# Patient Record
Sex: FEMALE | Race: WHITE | NOT HISPANIC OR LATINO | Employment: OTHER | URBAN - NONMETROPOLITAN AREA
[De-identification: names, ages, dates, MRNs, and addresses within clinical notes are randomized per-mention and may not be internally consistent; named-entity substitution may affect disease eponyms.]

---

## 2020-01-16 ENCOUNTER — OFFICE VISIT (OUTPATIENT)
Dept: CARDIOLOGY | Facility: CLINIC | Age: 75
End: 2020-01-16

## 2020-01-16 VITALS
DIASTOLIC BLOOD PRESSURE: 70 MMHG | SYSTOLIC BLOOD PRESSURE: 122 MMHG | WEIGHT: 137 LBS | HEIGHT: 59 IN | BODY MASS INDEX: 27.62 KG/M2 | HEART RATE: 85 BPM

## 2020-01-16 DIAGNOSIS — R53.83 FATIGUE, UNSPECIFIED TYPE: ICD-10-CM

## 2020-01-16 DIAGNOSIS — E88.81 METABOLIC SYNDROME: ICD-10-CM

## 2020-01-16 DIAGNOSIS — E78.00 HYPERCHOLESTEREMIA: ICD-10-CM

## 2020-01-16 DIAGNOSIS — R42 DIZZINESS: ICD-10-CM

## 2020-01-16 DIAGNOSIS — R73.9 HYPERGLYCEMIA: ICD-10-CM

## 2020-01-16 DIAGNOSIS — R09.89 BILATERAL CAROTID BRUITS: ICD-10-CM

## 2020-01-16 DIAGNOSIS — R07.2 PRECORDIAL PAIN: Primary | ICD-10-CM

## 2020-01-16 DIAGNOSIS — R55 SYNCOPE AND COLLAPSE: ICD-10-CM

## 2020-01-16 DIAGNOSIS — R01.1 MURMUR, CARDIAC: ICD-10-CM

## 2020-01-16 DIAGNOSIS — R06.02 SHORTNESS OF BREATH: ICD-10-CM

## 2020-01-16 PROCEDURE — 99204 OFFICE O/P NEW MOD 45 MIN: CPT | Performed by: INTERNAL MEDICINE

## 2020-01-16 PROCEDURE — 93000 ELECTROCARDIOGRAM COMPLETE: CPT | Performed by: INTERNAL MEDICINE

## 2020-01-16 RX ORDER — CITALOPRAM 20 MG/1
20 TABLET ORAL DAILY
COMMUNITY
End: 2020-12-14 | Stop reason: ALTCHOICE

## 2020-01-16 RX ORDER — LISINOPRIL 20 MG/1
20 TABLET ORAL DAILY
COMMUNITY
End: 2020-06-29 | Stop reason: SDUPTHER

## 2020-01-16 RX ORDER — PRAVASTATIN SODIUM 20 MG
20 TABLET ORAL DAILY
COMMUNITY
End: 2020-01-22 | Stop reason: ALTCHOICE

## 2020-01-16 NOTE — PATIENT INSTRUCTIONS
Mediterranean Diet  A Mediterranean diet refers to food and lifestyle choices that are based on the traditions of countries located on the [place]. This way of eating has been shown to help prevent certain conditions and improve outcomes for people who have chronic diseases, like kidney disease and heart disease.  What are tips for following this plan?  Lifestyle  · Cook and eat meals together with your family, when possible.  · Drink enough fluid to keep your urine clear or pale yellow.  · Be physically active every day. This includes:  ? Aerobic exercise like running or swimming.  ? Leisure activities like gardening, walking, or housework.  · Get 7-8 hours of sleep each night.  · If recommended by your health care provider, drink red wine in moderation. This means 1 glass a day for nonpregnant women and 2 glasses a day for men. A glass of wine equals 5 oz (150 mL).  Reading food labels    · Check the serving size of packaged foods. For foods such as rice and pasta, the serving size refers to the amount of cooked product, not dry.  · Check the total fat in packaged foods. Avoid foods that have saturated fat or trans fats.  · Check the ingredients list for added sugars, such as corn syrup.  Shopping  · At the grocery store, buy most of your food from the areas near the walls of the store. This includes:  ? Fresh fruits and vegetables (produce).  ? Grains, beans, nuts, and seeds. Some of these may be available in unpackaged forms or large amounts (in bulk).  ? Fresh seafood.  ? Poultry and eggs.  ? Low-fat dairy products.  · Buy whole ingredients instead of prepackaged foods.  · Buy fresh fruits and vegetables in-season from local farmers markets.  · Buy frozen fruits and vegetables in resealable bags.  · If you do not have access to quality fresh seafood, buy precooked frozen shrimp or canned fish, such as tuna, salmon, or sardines.  · Buy small amounts of raw or cooked vegetables, salads, or olives from  the deli or salad bar at your store.  · Stock your pantry so you always have certain foods on hand, such as olive oil, canned tuna, canned tomatoes, rice, pasta, and beans.  Cooking  · Cook foods with extra-virgin olive oil instead of using butter or other vegetable oils.  · Have meat as a side dish, and have vegetables or grains as your main dish. This means having meat in small portions or adding small amounts of meat to foods like pasta or stew.  · Use beans or vegetables instead of meat in common dishes like chili or lasagna.  · [place] with different cooking methods. Try roasting or broiling vegetables instead of steaming or sautéeing them.  · Add frozen vegetables to soups, stews, pasta, or rice.  · Add nuts or seeds for added healthy fat at each meal. You can add these to yogurt, salads, or vegetable dishes.  · Marinate fish or vegetables using olive oil, lemon juice, garlic, and fresh herbs.  Meal planning    · Plan to eat 1 vegetarian meal one day each week. Try to work up to 2 vegetarian meals, if possible.  · Eat seafood 2 or more times a week.  · Have healthy snacks readily available, such as:  ? Vegetable sticks with hummus.  ? Greek yogurt.  ? Fruit and nut trail mix.  · Eat balanced meals throughout the week. This includes:  ? Fruit: 2-3 servings a day  ? Vegetables: 4-5 servings a day  ? Low-fat dairy: 2 servings a day  ? Fish, poultry, or lean meat: 1 serving a day  ? Beans and legumes: 2 or more servings a week  ? Nuts and seeds: 1-2 servings a day  ? Whole grains: 6-8 servings a day  ? Extra-virgin olive oil: 3-4 servings a day  · Limit red meat and sweets to only a few servings a month  What are my food choices?  · Mediterranean diet  ? Recommended  ? Grains: Whole-grain pasta. Brown rice. Bulgar wheat. Polenta. Couscous. Whole-wheat bread. Oatmeal. Quinoa.  ? Vegetables: Artichokes. Beets. Broccoli. Cabbage. Carrots. Eggplant. Green beans. Chard. Kale. Spinach. Onions. Leeks. Peas. Squash.  Tomatoes. Peppers. Radishes.  ? Fruits: Apples. Apricots. Avocado. Berries. Bananas. Cherries. Dates. Figs. Grapes. [place]. Melon. Oranges. Peaches. Plums. Pomegranate.  ? Meats and other protein foods: Beans. Almonds. Sunflower seeds. Pine nuts. Peanuts. Cod. [place]. Scallops. Shrimp. Tuna. Tilapia. Clams. Oysters. Eggs.  ? Dairy: Low-fat milk. Cheese. Greek yogurt.  ? Beverages: Water. Red wine. Herbal tea.  ? Fats and oils: Extra virgin olive oil. Avocado oil. Grape seed oil.  ? Sweets and desserts: Greek yogurt with honey. Baked apples. Poached pears. Trail mix.  ? Seasoning and other foods: Basil. Cilantro. Coriander. Cumin. Mint. Parsley. [place]. Rosemary. Tarragon. Garlic. Oregano. Thyme. Pepper. Balsalmic vinegar. Tahini. Hummus. Tomato sauce. Olives. Mushrooms.  ? Limit these  ? Grains: Prepackaged pasta or rice dishes. Prepackaged cereal with added sugar.  ? Vegetables: Deep fried potatoes (french fries).  ? Fruits: Fruit canned in syrup.  ? Meats and other protein foods: Beef. Pork. Lamb. Poultry with skin. Hot dogs. Campbell.  ? Dairy: Ice cream. Sour cream. Whole milk.  ? Beverages: Juice. Sugar-sweetened soft drinks. Beer. Liquor and spirits.  ? Fats and oils: Butter. Canola oil. Vegetable oil. Beef fat (tallow). Lard.  ? Sweets and desserts: Cookies. Cakes. Pies. Candy.  ? Seasoning and other foods: Mayonnaise. Premade sauces and marinades.  ? The items listed may not be a complete list. Talk with your dietitian about what dietary choices are right for you.  Summary  · The Mediterranean diet includes both food and lifestyle choices.  · Eat a variety of fresh fruits and vegetables, beans, nuts, seeds, and whole grains.  · Limit the amount of red meat and sweets that you eat.  · Talk with your health care provider about whether it is safe for you to drink red wine in moderation. This means 1 glass a day for nonpregnant women and 2 glasses a day for men. A glass of wine equals 5 oz (150 mL).  This information  is not intended to replace advice given to you by your health care provider. Make sure you discuss any questions you have with your health care provider.  Document Released: 08/10/2017 Document Revised: 09/12/2017 Document Reviewed: 08/10/2017  [employer] Interactive Patient Education © 2019 [employer]

## 2020-01-16 NOTE — PROGRESS NOTES
Chief Complaint   Patient presents with   • Establish Care     chest pain, heart murmur   • Chest Pain     started about 2 months ago, occurs with exertion, mid chest, SOB noted, symptoms improve after resting.  Does report positional at times. Worse with taking in a deep breath of cold air or talking for an extended time.    • Shortness of Breath     with exertion, started about 2 months ago.    • Cardiac history     stress test several years ago but isn't sure when or exactly where.    • Aspirin     does not take a daily aspirin   • Labs     pt isn't sure when labs have been done, checking with [facility] lab        CARDIAC COMPLAINTS  chest pressure/discomfort, dyspnea, Dizziness, fatigue and syncope      Meli Frey is a 74 y.o. female came in today for her initial cardiac evaluation.  She did come in with one of her friends.  She apparently was diagnosed with hypertension, hypercholesterolemia and also possible bipolar disorder.  She was put on Celexa and also an ACE inhibitor and a statin.  She has been having problems in the form of confusion and feeling dizzy all the time.  She stopped taking her ACE inhibitors as well as the statin and felt little better.  She is now referred because she has been having chest pain for the last couple of months.  It occurs mostly with exertion.  It is in the mid part of the chest associated with increasing shortness of breath and the symptoms do improve if she takes rest .  She also started noticing the symptoms when she is exposed to cold air for long period of time.  She also has been noticing some dizziness which occurs mostly positional.  She is not sure when was the last time she did undergo any kind of lab work.  On further questioning she had 2 episodes of syncope about a year ago.  Both episodes happened on the same day and she did not seek any medical attention at that time it happened suddenly and lasted only for few seconds.  It was not associated with  any involuntary movements.  She also has problem with her weight apparently she lost fairly large amount of weight almost 40 pounds and then slowly gained some of them back.  She is not a smoker.  Her father  of a heart attack and so did her mother.  She is not sure exactly about the age since she was adopted.    History reviewed. No pertinent surgical history.    Current Outpatient Medications   Medication Sig Dispense Refill   • citalopram (CeleXA) 20 MG tablet Take 20 mg by mouth Daily.     • lisinopril (PRINIVIL,ZESTRIL) 20 MG tablet Take 20 mg by mouth Daily.     • pravastatin (PRAVACHOL) 20 MG tablet Take 20 mg by mouth Daily.       No current facility-administered medications for this visit.            ALLERGIES:  Patient has no known allergies.    Past Medical History:   Diagnosis Date   • Bipolar disease, chronic (CMS/[facility])    • COPD (chronic obstructive pulmonary disease) (CMS/HCC)    • Diabetes mellitus (CMS/HCC)    • Heart murmur    • History of cholecystectomy    • Hyperlipidemia    • Hypertension    • Sleep apnea        Social History     Tobacco Use   Smoking Status Never Smoker   Smokeless Tobacco Never Used          Family History   Problem Relation Age of Onset   • Heart attack Mother    • Other Mother    • Heart disease Mother          during open heart surgery   • Heart attack Father    • Other Father    • Other Other         5 siblings, medical history unknown    • No Known Problems Son    • No Known Problems Son    • No Known Problems Son        Review of Systems   Constitution: Positive for malaise/fatigue. Negative for decreased appetite.   HENT: Negative for congestion and sore throat.    Eyes: Negative for blurred vision.   Cardiovascular: Positive for chest pain, dyspnea on exertion, near-syncope and syncope.   Respiratory: Positive for shortness of breath. Negative for snoring.    Endocrine: Negative for cold intolerance and heat intolerance.   Hematologic/Lymphatic: Negative for  "adenopathy. Does not bruise/bleed easily.   Skin: Negative for itching, nail changes and skin cancer.   Musculoskeletal: Negative for arthritis and myalgias.   Gastrointestinal: Negative for abdominal pain, dysphagia and heartburn.   Genitourinary: Negative for bladder incontinence and frequency.   Neurological: Positive for dizziness and light-headedness. Negative for seizures and vertigo.   Psychiatric/Behavioral: Negative for altered mental status.   Allergic/Immunologic: Negative for environmental allergies and hives.       Diabetes- No  Thyroid- normal    Objective     /70 (BP Location: Left arm)   Pulse 85   Ht 149.9 cm (59\")   Wt 62.1 kg (137 lb)   BMI 27.67 kg/m²     Physical Exam   Constitutional: She is oriented to person, place, and time. She appears well-developed and well-nourished.   HENT:   Head: Normocephalic.   Nose: Nose normal.   Eyes: Pupils are equal, round, and reactive to light. EOM are normal.   Neck: Normal range of motion. Neck supple.   Cardiovascular: Normal rate, regular rhythm, S1 normal and S2 normal.   Murmur heard.   Harsh midsystolic murmur is present at the upper right sternal border radiating to the neck.  Pulmonary/Chest: Effort normal and breath sounds normal.   Abdominal: Soft. Bowel sounds are normal.   Musculoskeletal: Normal range of motion. She exhibits no edema.   Neurological: She is alert and oriented to person, place, and time.   Skin: Skin is warm and dry.   Psychiatric: She has a normal mood and affect.         ECG 12 Lead  Date/Time: 1/16/2020 1:23 PM  Performed by: Leeroy Donnelly MD  Authorized by: Leeroy Donnelly MD   Comparison: compared with previous ECG from 1/11/2018  Comparison to previous ECG: Now has LPFB  Rhythm: sinus rhythm  Rate: normal  Conduction: right bundle branch block and left posterior fascicular block  QRS axis: right    Clinical impression: abnormal EKG              Assessment/Plan   Patient's Body mass index is 27.67 " kg/m². BMI is above normal parameters. Recommendations include: educational material, exercise counseling and nutrition counseling.     Iona was seen today for establish care, chest pain, shortness of breath, cardiac history, aspirin and labs.    Diagnoses and all orders for this visit:    Precordial pain  -     High Sensitivity CRP; Future    Shortness of breath  -     Adult Transthoracic Echo Complete W/ Cont if Necessary Per Protocol; Future  -     CBC & Differential; Future    Murmur, cardiac  -     Adult Transthoracic Echo Complete W/ Cont if Necessary Per Protocol; Future    Hypercholesteremia  -     Lipid Panel; Future    Metabolic syndrome    Fatigue, unspecified type  -     Comprehensive Metabolic Panel; Future    Hyperglycemia  -     Hemoglobin A1c; Future    Bilateral carotid bruits  -     US Carotid Bilateral; Future    Dizziness  -     US Carotid Bilateral; Future    Syncope and collapse    At baseline her heart rate is within normal limit and so is her blood pressure.  Her EKG shows sinus rhythm, right bundle branch block, left posterior fascicular block.  When I reviewed the EKG which was done in 2018 she did not have the left posterior fascicular block which was present in 2013.  Her clinical examination reveals a BMI of 28.  She does have bruit in both the carotids.  I am not sure whether this is a conducted murmur or not.  She does have a very loud 5/6 ejection systolic murmur at the aortic area as well as systolic murmur at the mitral area.  She has normal peripheral pulse and no pedal edema.    The chest pain and shortness of breath which occurs mostly on exertion is very worrisome.  Though ideally I like to do a stress test on, given the fact she has a fairly loud harsh ejection systolic murmur, I like to do an echocardiogram to evaluate LV function, valvular structures as well as the PA pressure.  If there is significant aortic valvular stenosis, then she may need further work-up including  cardiac catheterization and consider valve replacement either by surgical means or by TAVAR.    She does have history of hypercholesterolemia in the past and used to be on a statin.  She has not taken any for the last 2 months.  I like to repeat the labs to check her liver function tests, lipids as well as the electrolytes.  If the LDL is elevated then she might benefit with a statin.    She does have family history of diabetes and in the past was told that her blood sugar was elevated.  I like to get an A1c done and if it is abnormal she may need to be on oral antidiabetic medication.    Regarding the dizziness and syncopal episode, if it is due to aortic valvular stenosis then the prognosis is much more grave.  She needs to have it corrected as soon as possible.    Regarding the carotid bruit, I am not sure whether this is a conducted murmur.  I did schedule her to undergo a carotid ultrasound to rule out any definite vascular stenosis.    Meanwhile I did advise her to be on aspirin 81 mg once a day.     Regarding her metabolic syndrome, I did advise her to cut down on the carbohydrate intake.  I did give her papers on Mediterranean diet.    Based on the results of the echo, further recommendations will be made.               Electronically signed by Leeroy Donnelly MD January 16, 2020 1:09 PM

## 2020-01-21 ENCOUNTER — HOSPITAL ENCOUNTER (OUTPATIENT)
Dept: CARDIOLOGY | Facility: HOSPITAL | Age: 75
Discharge: HOME | End: 2020-01-21

## 2020-01-21 ENCOUNTER — LAB (OUTPATIENT)
Dept: LAB | Facility: HOSPITAL | Age: 75
End: 2020-01-21

## 2020-01-21 ENCOUNTER — HOSPITAL ENCOUNTER (OUTPATIENT)
Dept: CARDIOLOGY | Facility: HOSPITAL | Age: 75
Discharge: HOME | End: 2020-01-21
Admitting: INTERNAL MEDICINE

## 2020-01-21 DIAGNOSIS — R73.9 HYPERGLYCEMIA: ICD-10-CM

## 2020-01-21 DIAGNOSIS — R06.02 SHORTNESS OF BREATH: ICD-10-CM

## 2020-01-21 DIAGNOSIS — R53.83 FATIGUE, UNSPECIFIED TYPE: ICD-10-CM

## 2020-01-21 DIAGNOSIS — R01.1 MURMUR, CARDIAC: ICD-10-CM

## 2020-01-21 DIAGNOSIS — R42 DIZZINESS: ICD-10-CM

## 2020-01-21 DIAGNOSIS — R07.2 PRECORDIAL PAIN: ICD-10-CM

## 2020-01-21 DIAGNOSIS — R09.89 BILATERAL CAROTID BRUITS: ICD-10-CM

## 2020-01-21 DIAGNOSIS — E78.00 HYPERCHOLESTEREMIA: ICD-10-CM

## 2020-01-21 LAB
ALBUMIN SERPL-MCNC: 3.98 G/DL (ref 3.5–5.2)
ALBUMIN/GLOB SERPL: 1.4 G/DL
ALP SERPL-CCNC: 52 U/L (ref 39–117)
ALT SERPL W P-5'-P-CCNC: 16 U/L (ref 1–33)
ANION GAP SERPL CALCULATED.3IONS-SCNC: 13.4 MMOL/L (ref 5–15)
AST SERPL-CCNC: 25 U/L (ref 1–32)
BASOPHILS # BLD AUTO: 0.05 10*3/MM3 (ref 0–0.2)
BASOPHILS NFR BLD AUTO: 0.7 % (ref 0–1.5)
BILIRUB SERPL-MCNC: 0.4 MG/DL (ref 0.2–1.2)
BUN BLD-MCNC: 18 MG/DL (ref 8–23)
BUN/CREAT SERPL: 18.4 (ref 7–25)
CALCIUM SPEC-SCNC: 9.2 MG/DL (ref 8.6–10.5)
CHLORIDE SERPL-SCNC: 103 MMOL/L (ref 98–107)
CHOLEST SERPL-MCNC: 230 MG/DL (ref 0–200)
CO2 SERPL-SCNC: 24.6 MMOL/L (ref 22–29)
CREAT BLD-MCNC: 0.98 MG/DL (ref 0.57–1)
DEPRECATED RDW RBC AUTO: 47.1 FL (ref 37–54)
EOSINOPHIL # BLD AUTO: 0.15 10*3/MM3 (ref 0–0.4)
EOSINOPHIL NFR BLD AUTO: 2.2 % (ref 0.3–6.2)
ERYTHROCYTE [DISTWIDTH] IN BLOOD BY AUTOMATED COUNT: 13.9 % (ref 12.3–15.4)
GFR SERPL CREATININE-BSD FRML MDRD: 56 ML/MIN/1.73
GLOBULIN UR ELPH-MCNC: 2.9 GM/DL
GLUCOSE BLD-MCNC: 102 MG/DL (ref 65–99)
HBA1C MFR BLD: 5.3 % (ref 4.8–5.6)
HCT VFR BLD AUTO: 49 % (ref 34–46.6)
HDLC SERPL-MCNC: 68 MG/DL (ref 40–60)
HGB BLD-MCNC: 14.9 G/DL (ref 12–15.9)
IMM GRANULOCYTES # BLD AUTO: 0.02 10*3/MM3 (ref 0–0.05)
IMM GRANULOCYTES NFR BLD AUTO: 0.3 % (ref 0–0.5)
LDLC SERPL CALC-MCNC: 146 MG/DL (ref 0–100)
LDLC/HDLC SERPL: 2.15 {RATIO}
LYMPHOCYTES # BLD AUTO: 2.15 10*3/MM3 (ref 0.7–3.1)
LYMPHOCYTES NFR BLD AUTO: 32 % (ref 19.6–45.3)
MCH RBC QN AUTO: 27.9 PG (ref 26.6–33)
MCHC RBC AUTO-ENTMCNC: 30.4 G/DL (ref 31.5–35.7)
MCV RBC AUTO: 91.8 FL (ref 79–97)
MONOCYTES # BLD AUTO: 0.39 10*3/MM3 (ref 0.1–0.9)
MONOCYTES NFR BLD AUTO: 5.8 % (ref 5–12)
NEUTROPHILS # BLD AUTO: 3.96 10*3/MM3 (ref 1.7–7)
NEUTROPHILS NFR BLD AUTO: 59 % (ref 42.7–76)
NRBC BLD AUTO-RTO: 0 /100 WBC (ref 0–0.2)
PLATELET # BLD AUTO: 232 10*3/MM3 (ref 140–450)
PMV BLD AUTO: 11 FL (ref 6–12)
POTASSIUM BLD-SCNC: 4.5 MMOL/L (ref 3.5–5.2)
PROT SERPL-MCNC: 6.9 G/DL (ref 6–8.5)
RBC # BLD AUTO: 5.34 10*6/MM3 (ref 3.77–5.28)
SODIUM BLD-SCNC: 141 MMOL/L (ref 136–145)
TRIGL SERPL-MCNC: 80 MG/DL (ref 0–150)
VLDLC SERPL-MCNC: 16 MG/DL
WBC NRBC COR # BLD: 6.72 10*3/MM3 (ref 3.4–10.8)

## 2020-01-21 PROCEDURE — 93306 TTE W/DOPPLER COMPLETE: CPT

## 2020-01-21 PROCEDURE — 83036 HEMOGLOBIN GLYCOSYLATED A1C: CPT | Performed by: INTERNAL MEDICINE

## 2020-01-21 PROCEDURE — 85025 COMPLETE CBC W/AUTO DIFF WBC: CPT | Performed by: INTERNAL MEDICINE

## 2020-01-21 PROCEDURE — 93306 TTE W/DOPPLER COMPLETE: CPT | Performed by: INTERNAL MEDICINE

## 2020-01-21 PROCEDURE — 93880 EXTRACRANIAL BILAT STUDY: CPT | Performed by: RADIOLOGY

## 2020-01-21 PROCEDURE — 86141 C-REACTIVE PROTEIN HS: CPT | Performed by: INTERNAL MEDICINE

## 2020-01-21 PROCEDURE — 80061 LIPID PANEL: CPT | Performed by: INTERNAL MEDICINE

## 2020-01-21 PROCEDURE — 36415 COLL VENOUS BLD VENIPUNCTURE: CPT

## 2020-01-21 PROCEDURE — 93880 EXTRACRANIAL BILAT STUDY: CPT

## 2020-01-21 PROCEDURE — 80053 COMPREHEN METABOLIC PANEL: CPT | Performed by: INTERNAL MEDICINE

## 2020-01-22 ENCOUNTER — TELEPHONE (OUTPATIENT)
Dept: CARDIOLOGY | Facility: CLINIC | Age: 75
End: 2020-01-22

## 2020-01-22 DIAGNOSIS — R01.1 MURMUR, CARDIAC: Primary | ICD-10-CM

## 2020-01-22 LAB
BH CV ECHO MEAS - ACS: 1.7 CM
BH CV ECHO MEAS - AO MAX PG (FULL): 97.6 MMHG
BH CV ECHO MEAS - AO MAX PG: 100.5 MMHG
BH CV ECHO MEAS - AO MEAN PG (FULL): 66 MMHG
BH CV ECHO MEAS - AO MEAN PG: 68 MMHG
BH CV ECHO MEAS - AO ROOT AREA (BSA CORRECTED): 1.8
BH CV ECHO MEAS - AO ROOT AREA: 6.4 CM^2
BH CV ECHO MEAS - AO ROOT DIAM: 2.9 CM
BH CV ECHO MEAS - AO V2 MAX: 501 CM/SEC
BH CV ECHO MEAS - AO V2 MEAN: 393 CM/SEC
BH CV ECHO MEAS - AO V2 VTI: 132 CM
BH CV ECHO MEAS - AVA(I,A): 0.39 CM^2
BH CV ECHO MEAS - AVA(V,A): 0.54 CM^2
BH CV ECHO MEAS - AVA(V,D): 0.54 CM^2
BH CV ECHO MEAS - BSA(HAYCOCK): 1.6 M^2
BH CV ECHO MEAS - BSA: 1.6 M^2
BH CV ECHO MEAS - BZI_BMI: 27.7 KILOGRAMS/M^2
BH CV ECHO MEAS - BZI_METRIC_HEIGHT: 149.9 CM
BH CV ECHO MEAS - BZI_METRIC_WEIGHT: 62.1 KG
BH CV ECHO MEAS - EDV(CUBED): 115.5 ML
BH CV ECHO MEAS - EDV(MOD-SP4): 66.4 ML
BH CV ECHO MEAS - EDV(TEICH): 111.2 ML
BH CV ECHO MEAS - EF(CUBED): 87.4 %
BH CV ECHO MEAS - EF(MOD-SP4): 43.8 %
BH CV ECHO MEAS - EF(TEICH): 81.1 %
BH CV ECHO MEAS - ESV(CUBED): 14.5 ML
BH CV ECHO MEAS - ESV(MOD-SP4): 37.3 ML
BH CV ECHO MEAS - ESV(TEICH): 21 ML
BH CV ECHO MEAS - FS: 49.9 %
BH CV ECHO MEAS - IVS/LVPW: 0.94
BH CV ECHO MEAS - IVSD: 1.1 CM
BH CV ECHO MEAS - LA DIMENSION: 3.8 CM
BH CV ECHO MEAS - LA/AO: 1.3
BH CV ECHO MEAS - LV DIASTOLIC VOL/BSA (35-75): 42.3 ML/M^2
BH CV ECHO MEAS - LV IVRT: 0.05 SEC
BH CV ECHO MEAS - LV MASS(C)D: 199.8 GRAMS
BH CV ECHO MEAS - LV MASS(C)DI: 127.2 GRAMS/M^2
BH CV ECHO MEAS - LV MAX PG: 2.9 MMHG
BH CV ECHO MEAS - LV MEAN PG: 2 MMHG
BH CV ECHO MEAS - LV SYSTOLIC VOL/BSA (12-30): 23.8 ML/M^2
BH CV ECHO MEAS - LV V1 MAX: 85.5 CM/SEC
BH CV ECHO MEAS - LV V1 MEAN: 58.5 CM/SEC
BH CV ECHO MEAS - LV V1 VTI: 16.5 CM
BH CV ECHO MEAS - LVIDD: 4.9 CM
BH CV ECHO MEAS - LVIDS: 2.4 CM
BH CV ECHO MEAS - LVLD AP4: 6.4 CM
BH CV ECHO MEAS - LVLS AP4: 5.8 CM
BH CV ECHO MEAS - LVOT AREA (M): 3.1 CM^2
BH CV ECHO MEAS - LVOT AREA: 3.1 CM^2
BH CV ECHO MEAS - LVOT DIAM: 2 CM
BH CV ECHO MEAS - LVPWD: 1.1 CM
BH CV ECHO MEAS - MV A MAX VEL: 99.4 CM/SEC
BH CV ECHO MEAS - MV DEC SLOPE: 762.5 CM/SEC^2
BH CV ECHO MEAS - MV E MAX VEL: 171 CM/SEC
BH CV ECHO MEAS - MV E/A: 1.7
BH CV ECHO MEAS - MV MAX PG: 12.7 MMHG
BH CV ECHO MEAS - MV MEAN PG: 4 MMHG
BH CV ECHO MEAS - MV P1/2T MAX VEL: 179 CM/SEC
BH CV ECHO MEAS - MV P1/2T: 68.8 MSEC
BH CV ECHO MEAS - MV V2 MAX: 178.3 CM/SEC
BH CV ECHO MEAS - MV V2 MEAN: 86.4 CM/SEC
BH CV ECHO MEAS - MV V2 VTI: 40.6 CM
BH CV ECHO MEAS - MVA P1/2T LCG: 1.2 CM^2
BH CV ECHO MEAS - MVA(P1/2T): 3.2 CM^2
BH CV ECHO MEAS - MVA(VTI): 1.3 CM^2
BH CV ECHO MEAS - RVDD: 2.5 CM
BH CV ECHO MEAS - SI(AO): 536.3 ML/M^2
BH CV ECHO MEAS - SI(CUBED): 64.3 ML/M^2
BH CV ECHO MEAS - SI(LVOT): 33 ML/M^2
BH CV ECHO MEAS - SI(MOD-SP4): 18.5 ML/M^2
BH CV ECHO MEAS - SI(TEICH): 57.4 ML/M^2
BH CV ECHO MEAS - SV(AO): 842.1 ML
BH CV ECHO MEAS - SV(CUBED): 101 ML
BH CV ECHO MEAS - SV(LVOT): 51.8 ML
BH CV ECHO MEAS - SV(MOD-SP4): 29.1 ML
BH CV ECHO MEAS - SV(TEICH): 90.2 ML
CRP SERPL-MCNC: 0.03 MG/DL (ref 0.01–0.5)
MAXIMAL PREDICTED HEART RATE: 148 BPM
STRESS TARGET HR: 126 BPM

## 2020-01-22 RX ORDER — ATORVASTATIN CALCIUM 20 MG/1
20 TABLET, FILM COATED ORAL DAILY
Qty: 90 TABLET | Refills: 3 | Status: SHIPPED | OUTPATIENT
Start: 2020-01-22 | End: 2020-12-14 | Stop reason: DRUGHIGH

## 2020-01-22 NOTE — PROGRESS NOTES
Pt aware of results and recommendations to change pravachol to Lipitor 20 mg daily. Pt would like script sent to [facility].  Stressed to pt to stop Pravachol. Understanding voiced.

## 2020-01-22 NOTE — TELEPHONE ENCOUNTER
----- Message from Leeroy Donnelly MD sent at 1/22/2020  3:48 PM EST -----  Change Pravachol to Lipitor 20 mg

## 2020-01-27 ENCOUNTER — HOSPITAL ENCOUNTER (OUTPATIENT)
Dept: CARDIOLOGY | Facility: HOSPITAL | Age: 75
Discharge: HOME | End: 2020-01-27

## 2020-01-27 VITALS — HEIGHT: 59 IN | BODY MASS INDEX: 27.6 KG/M2 | WEIGHT: 136.91 LBS

## 2020-01-27 DIAGNOSIS — R01.1 MURMUR, CARDIAC: ICD-10-CM

## 2020-01-27 LAB
AORTIC DIMENSIONLESS INDEX: 0.6 (DI)
BH CV ECHO MEAS - ACS: 1.7 CM
BH CV ECHO MEAS - AO MAX PG (FULL): 5.3 MMHG
BH CV ECHO MEAS - AO MAX PG: 7.4 MMHG
BH CV ECHO MEAS - AO MEAN PG (FULL): 3 MMHG
BH CV ECHO MEAS - AO MEAN PG: 4 MMHG
BH CV ECHO MEAS - AO ROOT AREA (BSA CORRECTED): 1.8
BH CV ECHO MEAS - AO ROOT AREA: 6.6 CM^2
BH CV ECHO MEAS - AO ROOT DIAM: 2.9 CM
BH CV ECHO MEAS - AO V2 MAX: 136 CM/SEC
BH CV ECHO MEAS - AO V2 MEAN: 91.4 CM/SEC
BH CV ECHO MEAS - AO V2 VTI: 30.9 CM
BH CV ECHO MEAS - AVA(I,A): 1.5 CM^2
BH CV ECHO MEAS - AVA(I,D): 1.5 CM^2
BH CV ECHO MEAS - AVA(V,A): 1.5 CM^2
BH CV ECHO MEAS - AVA(V,D): 1.5 CM^2
BH CV ECHO MEAS - BSA(HAYCOCK): 1.6 M^2
BH CV ECHO MEAS - BSA: 1.6 M^2
BH CV ECHO MEAS - BZI_BMI: 27.7 KILOGRAMS/M^2
BH CV ECHO MEAS - BZI_METRIC_HEIGHT: 149.9 CM
BH CV ECHO MEAS - BZI_METRIC_WEIGHT: 62.1 KG
BH CV ECHO MEAS - EDV(CUBED): 106.5 ML
BH CV ECHO MEAS - EDV(TEICH): 104.4 ML
BH CV ECHO MEAS - EF(CUBED): 76.9 %
BH CV ECHO MEAS - EF(TEICH): 68.9 %
BH CV ECHO MEAS - ESV(CUBED): 24.6 ML
BH CV ECHO MEAS - ESV(TEICH): 32.5 ML
BH CV ECHO MEAS - FS: 38.6 %
BH CV ECHO MEAS - IVS/LVPW: 1
BH CV ECHO MEAS - IVSD: 1.2 CM
BH CV ECHO MEAS - LA DIMENSION: 3.9 CM
BH CV ECHO MEAS - LA/AO: 1.3
BH CV ECHO MEAS - LV MASS(C)D: 217.4 GRAMS
BH CV ECHO MEAS - LV MASS(C)DI: 138.5 GRAMS/M^2
BH CV ECHO MEAS - LV MAX PG: 2.1 MMHG
BH CV ECHO MEAS - LV MEAN PG: 1 MMHG
BH CV ECHO MEAS - LV V1 MAX: 71.8 CM/SEC
BH CV ECHO MEAS - LV V1 MEAN: 41.2 CM/SEC
BH CV ECHO MEAS - LV V1 VTI: 16.5 CM
BH CV ECHO MEAS - LVIDD: 4.7 CM
BH CV ECHO MEAS - LVIDS: 2.9 CM
BH CV ECHO MEAS - LVOT AREA (M): 2.8 CM^2
BH CV ECHO MEAS - LVOT AREA: 2.8 CM^2
BH CV ECHO MEAS - LVOT DIAM: 1.9 CM
BH CV ECHO MEAS - LVPWD: 1.2 CM
BH CV ECHO MEAS - MR MAX PG: 40.2 MMHG
BH CV ECHO MEAS - MR MAX VEL: 317 CM/SEC
BH CV ECHO MEAS - RVDD: 2.8 CM
BH CV ECHO MEAS - SI(AO): 130 ML/M^2
BH CV ECHO MEAS - SI(CUBED): 52.1 ML/M^2
BH CV ECHO MEAS - SI(LVOT): 29.8 ML/M^2
BH CV ECHO MEAS - SI(TEICH): 45.8 ML/M^2
BH CV ECHO MEAS - SV(AO): 204.1 ML
BH CV ECHO MEAS - SV(CUBED): 81.9 ML
BH CV ECHO MEAS - SV(LVOT): 46.8 ML
BH CV ECHO MEAS - SV(TEICH): 71.9 ML
MAXIMAL PREDICTED HEART RATE: 148 BPM
STRESS TARGET HR: 126 BPM

## 2020-01-27 PROCEDURE — 93308 TTE F-UP OR LMTD: CPT

## 2020-01-28 DIAGNOSIS — I34.0 NONRHEUMATIC MITRAL VALVE REGURGITATION: Primary | ICD-10-CM

## 2020-02-10 ENCOUNTER — OUTSIDE FACILITY SERVICE (OUTPATIENT)
Dept: CARDIOLOGY | Facility: CLINIC | Age: 75
End: 2020-02-10

## 2020-02-10 DIAGNOSIS — I34.0 NONRHEUMATIC MITRAL VALVE REGURGITATION: Primary | ICD-10-CM

## 2020-02-10 PROCEDURE — 93460 R&L HRT ART/VENTRICLE ANGIO: CPT | Performed by: INTERNAL MEDICINE

## 2020-04-07 ENCOUNTER — TELEPHONE (OUTPATIENT)
Dept: CARDIOLOGY | Facility: CLINIC | Age: 75
End: 2020-04-07

## 2020-04-07 NOTE — TELEPHONE ENCOUNTER
Patient's friend called stating that patient's heart surgery has been postponed until pandemic is over. She states that patient is still having chest pain and that nurse with Dr. Hillman's office told them to call our office to see if we recommended adding anything for chest pain. Patient states that it feels like there is a rock in her chest, that is there almost all the time, and sometimes is worse than others, at times it seems intolerable. What are your recommendations?

## 2020-04-08 RX ORDER — ISOSORBIDE MONONITRATE 30 MG/1
30 TABLET, EXTENDED RELEASE ORAL EVERY MORNING
Qty: 30 TABLET | Refills: 11 | Status: SHIPPED | OUTPATIENT
Start: 2020-04-08 | End: 2020-04-08 | Stop reason: SDUPTHER

## 2020-04-08 RX ORDER — FAMOTIDINE 40 MG/1
40 TABLET, FILM COATED ORAL DAILY
Qty: 30 TABLET | Refills: 11 | Status: SHIPPED | OUTPATIENT
Start: 2020-04-08 | End: 2020-12-14 | Stop reason: ALTCHOICE

## 2020-04-08 RX ORDER — ISOSORBIDE MONONITRATE 30 MG/1
30 TABLET, EXTENDED RELEASE ORAL EVERY MORNING
Qty: 30 TABLET | Refills: 11 | Status: SHIPPED | OUTPATIENT
Start: 2020-04-08 | End: 2020-06-29

## 2020-04-08 RX ORDER — FAMOTIDINE 40 MG/1
40 TABLET, FILM COATED ORAL DAILY
Qty: 30 TABLET | Refills: 11 | Status: SHIPPED | OUTPATIENT
Start: 2020-04-08 | End: 2020-04-08 | Stop reason: SDUPTHER

## 2020-04-08 NOTE — TELEPHONE ENCOUNTER
Patient was made aware of adding imdur 30 mg daily and pepcid 40 mg daily. Scripts sent to [facility] in [place] with 30 tablets and 11 refills.     Scripts were sent in error to [facility]. I called and cancelled scripts and resent them to [facility].

## 2020-06-15 ENCOUNTER — TELEPHONE (OUTPATIENT)
Dept: CARDIOLOGY | Facility: CLINIC | Age: 75
End: 2020-06-15

## 2020-06-15 NOTE — TELEPHONE ENCOUNTER
Pt's son Leo called and LM asking to be called back regarding her impending heart surgery. After reviewing the chart his name is not on the release. Unable to notify him to advise him of that.    [phone]

## 2020-06-16 NOTE — TELEPHONE ENCOUNTER
Spoke with Leo to advise that he is not on [facility] but that we would be happy to talk with patient if she has any questions or problems to just have her call. Understanding voiced. Reports she is doing ok, he just had questions about impending surgery.

## 2020-06-29 ENCOUNTER — OFFICE VISIT (OUTPATIENT)
Dept: CARDIOLOGY | Facility: CLINIC | Age: 75
End: 2020-06-29

## 2020-06-29 VITALS
WEIGHT: 138.2 LBS | HEIGHT: 59 IN | SYSTOLIC BLOOD PRESSURE: 136 MMHG | HEART RATE: 74 BPM | TEMPERATURE: 98.1 F | DIASTOLIC BLOOD PRESSURE: 72 MMHG | BODY MASS INDEX: 27.86 KG/M2

## 2020-06-29 DIAGNOSIS — R01.1 MURMUR, CARDIAC: ICD-10-CM

## 2020-06-29 DIAGNOSIS — R42 DIZZINESS: ICD-10-CM

## 2020-06-29 DIAGNOSIS — I34.0 SEVERE MITRAL REGURGITATION: ICD-10-CM

## 2020-06-29 DIAGNOSIS — E78.00 HYPERCHOLESTEREMIA: ICD-10-CM

## 2020-06-29 DIAGNOSIS — R09.89 BILATERAL CAROTID BRUITS: ICD-10-CM

## 2020-06-29 DIAGNOSIS — R41.3 MEMORY LOSS: ICD-10-CM

## 2020-06-29 DIAGNOSIS — I25.118 CORONARY ARTERY DISEASE OF NATIVE ARTERY OF NATIVE HEART WITH STABLE ANGINA PECTORIS (HCC): ICD-10-CM

## 2020-06-29 DIAGNOSIS — I10 ESSENTIAL HYPERTENSION: ICD-10-CM

## 2020-06-29 PROCEDURE — 99214 OFFICE O/P EST MOD 30 MIN: CPT | Performed by: NURSE PRACTITIONER

## 2020-06-29 RX ORDER — LISINOPRIL 20 MG/1
20 TABLET ORAL DAILY
Qty: 90 TABLET | Refills: 2 | Status: SHIPPED | OUTPATIENT
Start: 2020-06-29 | End: 2020-12-14 | Stop reason: ALTCHOICE

## 2020-06-29 NOTE — PROGRESS NOTES
Chief Complaint   Patient presents with   • Follow-up     Cardiac management .    • Chest Pain     Has pain  in mid chest.   • Med Refill     Needs refills on Lisinopril 90 day supply to  [facility] . Says she is not taking Imdur d/t side effect of Headache .        Meli Frey is a 75 y.o. female seen in January 2020 for initial cardiac evaluation.  She has a history of hypertension, hypercholesterolemia and possible bipolar disorder.  She had been put on Celexa, ACE inhibitor, and statin therapy.  Due to feeling confused and dizzy she stopped ACE inhibitor as well as a statin and symptoms subsided.  She was referred for further evaluation of chest pain and increased shortness of breath.  She also reported issues with dizziness and 2 episodes of syncope.  At consultation labs, carotid ultrasound and echocardiogram were ordered.  She was advised to start daily aspirin. Carotid ultrasound showed possible 50-69% stenosis of the left ICA.  Echocardiogram showed moderate to severe MR.  Cardiac catheterization was advised and done 2/10/2020 and showed 90% disease of LAD, mild aortic stenosis and significant mitral regurg with a gradient of 10 mmHg.  Her ejection fraction was 60%. Mitral valve repair along with LIMA to LAD was recommended and her film was sent to Dr. Hillman.  On 2/20/2020 she seen Dr. Hillman and according to patient plan was to proceed with surgery however postponed due to COVID-19 pandemic.  Given the patient is [employer] iron and erythropoietin recommended to aid postop recovery in regards to surgical blood loss.  In April, our office was contacted by Dr. Hillman's office indicating patient had reported more chest pain.  Imdur and Pepcid prescribed.    Today she comes to the office for a follow up visit. Her main concern is forgetfulness/dementia.  She also admits to some mild chest pain but denies syncopal episodes.  Imdur was stopped due to causing her headaches.  She  "admits to having persistent dizziness and mild shortness of breath.  According to patient she has not yet had surgery due to \"the virus\", as well as getting medications \"approved by insurance\".  According to patient she does live alone but has help from [employer] members and a good friend.  Apparently her children live out of state but reports her son will come from [place] to stay with her after surgery.  There was some difficulty obtaining her review of systems and history due to memory issues.  It appears she stopped Imdur due to headache.  She continues Pepcid.  It appears her chest pain has improved, being mild in nature.    HPI     Cardiac History:    Past Surgical History:   Procedure Laterality Date   • CARDIAC CATHETERIZATION  02/10/2020    90% LAD, Sig MR, AV Gradient 10 mmHg. EF 60%   • ECHO - CONVERTED  01/21/2020    EF 55%. Mod- severe MR. LA- 3.8 Cm   • ECHO - CONVERTED  01/27/2020    EF 55%. LA- 3.8 Cm. Mod- severe MR. LISA- 1.5 Cm2.   • US CAROTID UNILATERAL  01/21/2020    ? 50-69% (L) ICA. Tortuosity       Current Outpatient Medications   Medication Sig Dispense Refill   • atorvastatin (LIPITOR) 20 MG tablet Take 1 tablet by mouth Daily. STOP Pravachol 90 tablet 3   • citalopram (CeleXA) 20 MG tablet Take 20 mg by mouth Daily.     • famotidine (Pepcid) 40 MG tablet Take 1 tablet by mouth Daily. 30 tablet 11   • lisinopril (PRINIVIL,ZESTRIL) 20 MG tablet Take 1 tablet by mouth Daily. 90 tablet 2     No current facility-administered medications for this visit.        Patient has no known allergies.    Past Medical History:   Diagnosis Date   • Bipolar disease, chronic (CMS/HCC)    • COPD (chronic obstructive pulmonary disease) (CMS/HCC)    • Diabetes mellitus (CMS/HCC)    • Heart murmur    • History of cholecystectomy    • Hyperlipidemia    • Hypertension    • Sleep apnea        Social History     Socioeconomic History   • Marital status:      Spouse name: Not on file   • Number of children: Not on " "file   • Years of education: Not on file   • Highest education level: Not on file   Tobacco Use   • Smoking status: Never Smoker   • Smokeless tobacco: Never Used   Substance and Sexual Activity   • Alcohol use: Never     Frequency: Never   • Drug use: Never       Family History   Problem Relation Age of Onset   • Heart attack Mother    • Other Mother    • Heart disease Mother          during open heart surgery   • Heart attack Father    • Other Father    • Other Other         5 siblings, medical history unknown    • No Known Problems Son    • No Known Problems Son    • No Known Problems Son        Review of Systems   Constitution: Positive for malaise/fatigue. Negative for decreased appetite, diaphoresis and fever.   HENT: Negative.    Eyes: Negative for redness and visual disturbance.   Cardiovascular: Positive for chest pain and near-syncope (denies being recent). Negative for leg swelling, palpitations and syncope.   Respiratory: Positive for shortness of breath and sleep disturbances due to breathing.    Endocrine: Negative for polydipsia, polyphagia and polyuria.   Hematologic/Lymphatic: Negative for bleeding problem. Does not bruise/bleed easily.   Skin: Negative.    Musculoskeletal: Negative for muscle cramps and muscle weakness.   Gastrointestinal: Negative.    Genitourinary: Negative for dysuria and hematuria.   Neurological: Positive for dizziness and light-headedness. Negative for numbness.   Psychiatric/Behavioral: Positive for depression and memory loss. The patient has insomnia and is nervous/anxious.         Objective     /72 (BP Location: Left arm)   Pulse 74   Temp 98.1 °F (36.7 °C)   Ht 149 cm (58.66\")   Wt 62.7 kg (138 lb 3.2 oz)   BMI 28.24 kg/m²     Physical Exam   Constitutional: She is oriented to person, place, and time. She appears well-nourished.   HENT:   Head: Normocephalic.   Eyes: Pupils are equal, round, and reactive to light.   Neck: Normal range of motion. Carotid " bruit is present.   Cardiovascular: Normal rate and regular rhythm.   Murmur heard.  High-pitched blowing holosystolic murmur is present with a grade of 5/6.  Pulses:       Radial pulses are 2+ on the right side, and 2+ on the left side.   Pulmonary/Chest: Breath sounds normal. She has no wheezes. She has no rales.   Abdominal: Soft. Bowel sounds are normal. There is no tenderness.   Musculoskeletal: Normal range of motion. She exhibits no edema.   Neurological: She is alert and oriented to person, place, and time.   Skin: Skin is warm and intact. No cyanosis. No pallor. Nails show no clubbing.   Psychiatric: Her speech is normal. Her mood appears anxious (mildly). She is slowed (slightly). Cognition and memory are impaired. She exhibits abnormal recent memory.        Procedures: none today         Assessment/Plan      Iona was seen today for follow-up, chest pain and med refill.    Diagnoses and all orders for this visit:    Coronary artery disease of native artery of native heart with stable angina pectoris (CMS/HCC)    Severe mitral regurgitation    Murmur, cardiac    Hypercholesteremia    Essential hypertension  -     lisinopril (PRINIVIL,ZESTRIL) 20 MG tablet; Take 1 tablet by mouth Daily.    Dizziness    Bilateral carotid bruits    Memory loss      Patient presents with persistent chest pain, mild in nature.  She was unable to tolerate Imdur secondary to headaches. She admits to continued episodes of dizziness but denies syncope or worsening shortness of breath.  According to patient she is ready to proceed with open heart surgery, awaiting on surgeon's office to notify her of rescheduling.  She does have problems with memory but states she has adequate resources including a close friend, [employer] members, and a son who lives out of state but will be helping her post operatively.     Her blood pressure, heart rate, and heart rhythm today are normal.  Prescription for refills of lisinopril faxed to her  pharmacy.    For management of hypercholesterolemia she is taking high intensity statin therapy in form of Lipitor 20 mg at night.  No side effects noted at this time.    Patient's Body mass index is 28.24 kg/m². BMI is above normal parameters. Recommendations include: nutrition counseling.  Her weight is similar to last office visit.  I encouraged her on maintaining good nutritional intake including iron rich foods to prevent anemia and to maintain good immune status.     Iona Frey  reports that she has never smoked. She has never used smokeless tobacco.     A 6-month follow-up visit scheduled.  Please call sooner for cardiac concerns.           Electronically signed by SONDRA Rizzo,  June 30, 2020 21:42

## 2020-07-02 ENCOUNTER — LAB (OUTPATIENT)
Dept: LAB | Facility: HOSPITAL | Age: 75
End: 2020-07-02

## 2020-07-02 ENCOUNTER — TRANSCRIBE ORDERS (OUTPATIENT)
Dept: LAB | Facility: HOSPITAL | Age: 75
End: 2020-07-02

## 2020-07-02 DIAGNOSIS — E61.1 IRON DEFICIENCY: ICD-10-CM

## 2020-07-02 DIAGNOSIS — I35.0 AORTIC STENOSIS, MILD: ICD-10-CM

## 2020-07-02 DIAGNOSIS — I35.0 AORTIC STENOSIS, MILD: Primary | ICD-10-CM

## 2020-07-02 LAB
DEPRECATED RDW RBC AUTO: 46.4 FL (ref 37–54)
ERYTHROCYTE [DISTWIDTH] IN BLOOD BY AUTOMATED COUNT: 13.8 % (ref 12.3–15.4)
FERRITIN SERPL-MCNC: 79.31 NG/ML (ref 13–150)
HCT VFR BLD AUTO: 48.6 % (ref 34–46.6)
HGB BLD-MCNC: 15.3 G/DL (ref 12–15.9)
IRON 24H UR-MRATE: 62 MCG/DL (ref 37–145)
IRON SATN MFR SERPL: 19 % (ref 20–50)
MCH RBC QN AUTO: 28.5 PG (ref 26.6–33)
MCHC RBC AUTO-ENTMCNC: 31.5 G/DL (ref 31.5–35.7)
MCV RBC AUTO: 90.5 FL (ref 79–97)
PLATELET # BLD AUTO: 211 10*3/MM3 (ref 140–450)
PMV BLD AUTO: 11.1 FL (ref 6–12)
RBC # BLD AUTO: 5.37 10*6/MM3 (ref 3.77–5.28)
TIBC SERPL-MCNC: 322 MCG/DL (ref 298–536)
TRANSFERRIN SERPL-MCNC: 216 MG/DL (ref 200–360)
WBC # BLD AUTO: 7.47 10*3/MM3 (ref 3.4–10.8)

## 2020-07-02 PROCEDURE — 82728 ASSAY OF FERRITIN: CPT | Performed by: THORACIC SURGERY (CARDIOTHORACIC VASCULAR SURGERY)

## 2020-07-02 PROCEDURE — 84466 ASSAY OF TRANSFERRIN: CPT | Performed by: THORACIC SURGERY (CARDIOTHORACIC VASCULAR SURGERY)

## 2020-07-02 PROCEDURE — 36415 COLL VENOUS BLD VENIPUNCTURE: CPT

## 2020-07-02 PROCEDURE — 83540 ASSAY OF IRON: CPT | Performed by: THORACIC SURGERY (CARDIOTHORACIC VASCULAR SURGERY)

## 2020-07-02 PROCEDURE — 85027 COMPLETE CBC AUTOMATED: CPT | Performed by: THORACIC SURGERY (CARDIOTHORACIC VASCULAR SURGERY)

## 2020-10-13 RX ORDER — ATORVASTATIN CALCIUM 10 MG/1
TABLET, FILM COATED ORAL
Qty: 30 TABLET | Refills: 5 | OUTPATIENT
Start: 2020-10-13

## 2020-10-13 RX ORDER — LISINOPRIL 10 MG/1
TABLET ORAL
Qty: 30 TABLET | Refills: 5 | OUTPATIENT
Start: 2020-10-13

## 2020-12-14 ENCOUNTER — OFFICE VISIT (OUTPATIENT)
Dept: CARDIOLOGY | Facility: CLINIC | Age: 75
End: 2020-12-14

## 2020-12-14 VITALS
DIASTOLIC BLOOD PRESSURE: 72 MMHG | WEIGHT: 126 LBS | BODY MASS INDEX: 25.4 KG/M2 | HEIGHT: 59 IN | SYSTOLIC BLOOD PRESSURE: 124 MMHG | HEART RATE: 88 BPM

## 2020-12-14 DIAGNOSIS — I10 ESSENTIAL HYPERTENSION: ICD-10-CM

## 2020-12-14 DIAGNOSIS — E78.00 HYPERCHOLESTEREMIA: ICD-10-CM

## 2020-12-14 DIAGNOSIS — R01.1 MURMUR, CARDIAC: ICD-10-CM

## 2020-12-14 DIAGNOSIS — R07.89 CHEST PAIN, ATYPICAL: Primary | ICD-10-CM

## 2020-12-14 DIAGNOSIS — L08.9 WOUND INFECTION: ICD-10-CM

## 2020-12-14 DIAGNOSIS — E66.3 OVERWEIGHT: ICD-10-CM

## 2020-12-14 DIAGNOSIS — T14.8XXA WOUND INFECTION: ICD-10-CM

## 2020-12-14 PROCEDURE — 99214 OFFICE O/P EST MOD 30 MIN: CPT | Performed by: NURSE PRACTITIONER

## 2020-12-14 RX ORDER — CEPHALEXIN 500 MG/1
CAPSULE ORAL
COMMUNITY

## 2020-12-14 RX ORDER — METOPROLOL TARTRATE 50 MG/1
50 TABLET, FILM COATED ORAL 2 TIMES DAILY
COMMUNITY

## 2020-12-14 RX ORDER — MULTIVIT WITH MINERALS/LUTEIN
250 TABLET ORAL 2 TIMES DAILY
COMMUNITY

## 2020-12-14 RX ORDER — LEVOFLOXACIN 750 MG/1
750 TABLET ORAL
COMMUNITY

## 2020-12-14 RX ORDER — SOY ISOFLAVONE 40 MG
TABLET ORAL 2 TIMES DAILY
COMMUNITY

## 2020-12-14 RX ORDER — ATORVASTATIN CALCIUM 40 MG/1
40 TABLET, FILM COATED ORAL DAILY
COMMUNITY

## 2020-12-14 RX ORDER — ASPIRIN 81 MG/1
81 TABLET ORAL DAILY
COMMUNITY

## 2020-12-14 RX ORDER — ACETAMINOPHEN 325 MG/1
650 TABLET ORAL EVERY 6 HOURS PRN
COMMUNITY

## 2020-12-14 NOTE — PROGRESS NOTES
"Chief Complaint   Patient presents with   • Follow-up     for cardiac management, CABG with MV repair Aug 12/20, Dr Hillman   • Post-op Open Heart Surgery      Complications with wound,  ID following, currently has wound vac.    • Labs     pt is not sure if she has had labs since discharge or not   • Chest Pain     describes as a stabbing pain that is there \" all the time\" , worse with certain movements, contributes to her recent surgery.    • Rehab     pt now resides at [facility].        Cardiac Complaints  chest pressure/discomfort      Subjective   Iona Frey is a 75 y.o. female with hypertension, severe MR, hypercholesterolemia, IHD, and bipolar disorder.  She had been put on Celexa, ACE inhibitor, and statin therapy.  Due to feeling confused and dizzy she stopped ACE inhibitor as well as a statin and symptoms subsided.  She was referred for further evaluation of chest pain and increased shortness of breath.  She also reported issues with dizziness and 2 episodes of syncope.  At consultation labs, carotid ultrasound and echocardiogram were ordered.  She was advised to start daily aspirin. Carotid ultrasound showed possible 50-69% stenosis of the left ICA.  Echocardiogram showed moderate to severe MR.  Cardiac catheterization was advised and done 2/10/2020 and showed 90% disease of LAD, mild aortic stenosis and significant mitral regurg with a gradient of 10 mmHg.  Her ejection fraction was 60%. Mitral valve repair along with LIMA to LAD was recommended and her film was sent to Dr. Hillman.  On 2/20/2020 she seen Dr. Hillman and according to patient plan was to proceed with surgery however postponed due to COVID-19 pandemic.  Given the patient is [employer] iron and erythropoietin recommended to aid postop recovery in regards to surgical blood loss.  In April, our office was contacted by Dr. Hillman's office indicating patient had reported more chest pain.  Imdur and Pepcid " prescribed.    Patient returns today for follow up accompanied by a care giver from [facility].  Much of subjective information from caregiver as patient not good historian.   Patient does report single vessel CABG with MVR August 12, 2020 with Dr. Hillman that was complicated by sternal wound infection. She is currently wearing a wound vac to the area.  She does report some sharp chest pain but admits it is random in nature, it has been the same for sometime and certain movements/positions will make it worse. Labs she reports have been done with PCP, but no copy are available for review. NO refills needed.      Cardiac History  Past Surgical History:   Procedure Laterality Date   • CARDIAC CATHETERIZATION  02/10/2020    90% LAD, Sig MR, AV Gradient 10 mmHg. EF 60%   • CORONARY ARTERY BYPASS GRAFT  08/20/2020    @ with Rafy, single bypass with MVR(mitral valve repair)   • ECHO - CONVERTED  01/21/2020    EF 55%. Mod- severe MR. LA- 3.8 Cm   • ECHO - CONVERTED  01/27/2020    EF 55%. LA- 3.8 Cm. Mod- severe MR. LISA- 1.5 Cm2.   • US CAROTID UNILATERAL  01/21/2020    ? 50-69% (L) ICA. Tortuosity       Current Outpatient Medications   Medication Sig Dispense Refill   • acetaminophen (TYLENOL) 325 MG tablet Take 650 mg by mouth Every 6 (Six) Hours As Needed for Mild Pain .     • aspirin 81 MG EC tablet Take 81 mg by mouth Daily.     • atorvastatin (LIPITOR) 40 MG tablet Take 40 mg by mouth Daily.     • cephalexin (KEFLEX) 500 MG capsule 2 tabs every 8 hours     • enoxaparin (Lovenox) 40 MG/0.4ML solution syringe Inject  under the skin into the appropriate area as directed Daily.     • L-Arginine 500 MG capsule Take  by mouth 2 (Two) Times a Day.     • levoFLOXacin (LEVAQUIN) 750 MG tablet Take 750 mg by mouth. Once a day every other day     • metoprolol tartrate (LOPRESSOR) 50 MG tablet Take 50 mg by mouth 2 (Two) Times a Day.     • vitamin C (ASCORBIC ACID) 250 MG tablet Take 250 mg by mouth 2 (Two) Times a  Day.     • Zinc Sulfate (ZINC-220 PO) Take  by mouth Daily.       No current facility-administered medications for this visit.        Patient has no known allergies.    Past Medical History:   Diagnosis Date   • Bipolar disease, chronic (CMS/[facility])    • COPD (chronic obstructive pulmonary disease) (CMS/[facility])    • Diabetes mellitus (CMS/[facility])    • Heart murmur    • History of cholecystectomy    • Hyperlipidemia    • Hypertension    • Sleep apnea        Social History     Socioeconomic History   • Marital status:      Spouse name: Not on file   • Number of children: Not on file   • Years of education: Not on file   • Highest education level: Not on file   Tobacco Use   • Smoking status: Never Smoker   • Smokeless tobacco: Never Used   Substance and Sexual Activity   • Alcohol use: Never     Frequency: Never   • Drug use: Never       Family History   Problem Relation Age of Onset   • Heart attack Mother    • Other Mother    • Heart disease Mother          during open heart surgery   • Heart attack Father    • Other Father    • Other Other         5 siblings, medical history unknown    • No Known Problems Son    • No Known Problems Son    • No Known Problems Son        Review of Systems   Constitution: Negative for malaise/fatigue and night sweats.   Cardiovascular: Positive for chest pain. Negative for claudication, dyspnea on exertion, irregular heartbeat, leg swelling, near-syncope, orthopnea, palpitations and syncope.   Respiratory: Negative for cough, shortness of breath and wheezing.    Musculoskeletal: Positive for stiffness. Negative for back pain and joint pain.   Gastrointestinal: Negative for anorexia, heartburn, melena, nausea and vomiting.   Genitourinary: Negative for dysuria, hematuria, hesitancy and nocturia.   Neurological: Negative for dizziness, light-headedness and loss of balance.   Psychiatric/Behavioral: Negative for depression and memory loss. The patient is nervous/anxious.   "          Objective     /72   Pulse 88   Ht 149.9 cm (59\")   Wt 57.2 kg (126 lb)   BMI 25.45 kg/m²     Constitutional:       Appearance: Healthy appearance. Not in distress.   Eyes:      Pupils: Pupils are equal, round, and reactive to light.   HENT:    Mouth/Throat:      Dentition: Normal.      Pharynx: Oropharynx is clear.   Neck:      Musculoskeletal: Normal range of motion and neck supple.   Cardiovascular:      PMI at left midclavicular line. Normal rate. Regular rhythm.      Murmurs: There is a systolic murmur.   Abdominal:      Palpations: Abdomen is soft.   Musculoskeletal: Normal range of motion.   Skin:     General: Skin is warm and dry.      Findings: Wound present.   Neurological:      Mental Status: Oriented to person, place and time.         Procedures    Assessment/Plan     IHD:  On ASA therapy without concerns. Patient tolerating well. Bleeding and bruising denied. No new or worsening concerns are voiced. She will follow with Dr. Hillman soon in regards.     MVR:  Murmur noted. She does report feeling slightly improved from prior. Unsure if echo is to be done with Rafy at follow up this week.  If not, we will order after next follow up.    HTN: Blood pressure is stable. No changes to current lopressor therapy will be recommended. Limited sodium intake advised.    Hyperlipidemia:  On statin therapy with lipitor. Patient tolerates well. FLP caregiver reports with PCP.     Wound infection:  Followed by Dr. Hillman. Wound Vac in place. Taking ABX therapy without concerns.     BMI noted at 25.45 with weight loss noted. Patient states this is from being in care home and limiting her caloric intake.    No refills needed.    6 month follow up recommended or sooner if needed.       Problems Addressed this Visit        Cardiovascular and Mediastinum    Hypercholesteremia    Relevant Medications    atorvastatin (LIPITOR) 40 MG tablet    Murmur, cardiac      Other Visit Diagnoses     Chest pain, " atypical    -  Primary    Essential hypertension        Relevant Medications    metoprolol tartrate (LOPRESSOR) 50 MG tablet    Wound infection        Relevant Medications    cephalexin (KEFLEX) 500 MG capsule    levoFLOXacin (LEVAQUIN) 750 MG tablet    Overweight          Diagnoses       Codes Comments    Chest pain, atypical    -  Primary ICD-10-CM: R07.89  ICD-9-CM: 786.59     Murmur, cardiac     ICD-10-CM: R01.1  ICD-9-CM: 785.2     Hypercholesteremia     ICD-10-CM: E78.00  ICD-9-CM: 272.0     Essential hypertension     ICD-10-CM: I10  ICD-9-CM: 401.9     Wound infection     ICD-10-CM: T14.8XXA, L08.9  ICD-9-CM: 958.3     Overweight     ICD-10-CM: E66.3  ICD-9-CM: 278.02           Patient's Body mass index is 25.45 kg/m². BMI is above normal parameters. Recommendations include: nutrition counseling.            Electronically signed by SONDRA Madrid December 14, 2020 16:14 EST